# Patient Record
(demographics unavailable — no encounter records)

---

## 2024-11-27 NOTE — HISTORY OF PRESENT ILLNESS
[FreeTextEntry1] : cpe [de-identified] : CORRIE ABRAHAM is a 48 year F who presents for CPE PMHx invasive ductal carcinoma of L breast (2021), anxiety and depression Feels well overall Denies recent surgeries or hospitalizations.  Heme/onc Dr. Villela Breast surgeon Dr. Deirdre Pichardo Ob/gynDr. Melissa Oppenheim

## 2024-11-27 NOTE — HEALTH RISK ASSESSMENT
[No] : In the past 12 months have you used drugs other than those required for medical reasons? No [0] : 2) Feeling down, depressed, or hopeless: Not at all (0) [PHQ-2 Negative - No further assessment needed] : PHQ-2 Negative - No further assessment needed [Never] : Never [Patient reported colonoscopy was normal] : Patient reported colonoscopy was normal [Employed] : employed [] :  [# Of Children ___] : has [unfilled] children [Feels Safe at Home] : Feels safe at home [Fully functional (bathing, dressing, toileting, transferring, walking, feeding)] : Fully functional (bathing, dressing, toileting, transferring, walking, feeding) [Fully functional (using the telephone, shopping, preparing meals, housekeeping, doing laundry, using] : Fully functional and needs no help or supervision to perform IADLs (using the telephone, shopping, preparing meals, housekeeping, doing laundry, using transportation, managing medications and managing finances) [Patient reported PAP Smear was normal] : Patient reported PAP Smear was normal [[id number]] : 0 [MammogramDate] : 10/2023 [PapSmearDate] : 10/24 [BoneDensityDate] : 05/24 [BoneDensityComments] : osteopenia [ColonoscopyDate] : 11/22 [ColonoscopyComments] : GI Dr. Trinidad, repeat due 2032 [de-identified] : , 2 children  [FreeTextEntry2] :

## 2024-11-27 NOTE — HISTORY OF PRESENT ILLNESS
[FreeTextEntry1] : cpe [de-identified] : CORRIE ABRAHAM is a 48 year F who presents for CPE PMHx invasive ductal carcinoma of L breast (2021), anxiety and depression Feels well overall Denies recent surgeries or hospitalizations.  Heme/onc Dr. Villela Breast surgeon Dr. Deirdre Pichardo Ob/gynDr. Melissa Oppenheim

## 2024-11-27 NOTE — HEALTH RISK ASSESSMENT
[No] : In the past 12 months have you used drugs other than those required for medical reasons? No [0] : 2) Feeling down, depressed, or hopeless: Not at all (0) [PHQ-2 Negative - No further assessment needed] : PHQ-2 Negative - No further assessment needed [Never] : Never [Patient reported colonoscopy was normal] : Patient reported colonoscopy was normal [Employed] : employed [] :  [# Of Children ___] : has [unfilled] children [Feels Safe at Home] : Feels safe at home [Fully functional (bathing, dressing, toileting, transferring, walking, feeding)] : Fully functional (bathing, dressing, toileting, transferring, walking, feeding) [Fully functional (using the telephone, shopping, preparing meals, housekeeping, doing laundry, using] : Fully functional and needs no help or supervision to perform IADLs (using the telephone, shopping, preparing meals, housekeeping, doing laundry, using transportation, managing medications and managing finances) [Patient reported PAP Smear was normal] : Patient reported PAP Smear was normal [[id number]] : 0 [MammogramDate] : 10/2023 [PapSmearDate] : 10/24 [BoneDensityDate] : 05/24 [BoneDensityComments] : osteopenia [ColonoscopyDate] : 11/22 [ColonoscopyComments] : GI Dr. Trinidad, repeat due 2032 [de-identified] : , 2 children  [FreeTextEntry2] :

## 2024-11-27 NOTE — ASSESSMENT
[FreeTextEntry1] : Health Care Maintenance - well visit - routine labs ordered - depression screen negative - pap smear 10/2024 - mammogram/US- due, ordered - colonoscopy 11/2022. repeat due 11/2032 - flu vaccine- given and tolerated well  - covid vaccines- reports 4 doses - tdap- unsure when last received, advised to check records and obtain if 10+ years since last dose - advised to get annual eye exams with optometry/ophthalmology, skin exams with dermatology, and dental exams - RTC for CPE in 1 year or sooner prn  Invasive ductal carcinoma of L breast (2021) -s/p L lumpectomy, RT, now on anastrazole qd and lupron q3 months -mammogram/US annually, due, ordered  -s/p MRI 4/2024 -follows with heme/onc Dr. Jamie Villela and breast surgeon Dr. Deirdre Pichardo  Anxiety/depression -c/w sertraline qd  Eczema on abdomen -reports known history -topical triamcinolone rx'ed, use prn

## 2024-11-27 NOTE — HISTORY OF PRESENT ILLNESS
[Disease: _____________________] : Disease: [unfilled] [T: ___] : T[unfilled] [N: ___] : N[unfilled] [AJCC Stage: ____] : AJCC Stage: [unfilled] [de-identified] : Age 45: breast cancer Screen detected: mammo in February 2021, heterogenously dense breasts, no evidence of malignancy. MRI was recommended for elevated Tyrer Rosalia risk score. She had MRI breasts on 9/7/21 revealed which showed L breast 5 mm enhancing focus in upper central breast, middle depth, L breast 1.7 cm peripherally enhancing mass in upper slightly outer left breast, L outer lower breast anterior third, 8 mm enhancing mass, no significant axillary or internal mammary LAD. She had MRI guided biopsy on 9/27/21: Fibrocystic disease including adenosis, microcystic change with apocrine metaplasia, and stromal fibrosis; Fibroadenomatoid change ,L US breast biopsy on 10/4/21 at 12:00, 4 cm from nipple, core biopsy (corresponds to the slightly outer left breast) - path showed Invasive mammary carcinoma, moderately differentiated, favor ductal type (confirmed ductal with positive e-cadherin). [place] score 6/9 (tubules 3/3, nuclear atypia 2/3, mitoses 1/3). Maximum linear tumor length = 1.2 cm, ER 99%, [place] 99%, HER2 2+ CISH neg and L outer lower breast US biopsy at 3:00, retroareolar, core biopsy showed fibroadenoma. CT C/A/P 10/19/21 showed no definitive evidence for metastatic disease within the chest/abdomen and pelvis. Few nonspecific pulmonary micronodules size 2mm, small sclerotic foci likely reflecting benign bone islands in the right proximal humerus and L5 vertebral body. Nuclear bone scan on 10/26/21 showed no radionuclide evidence of osseous metastases.  She has stage II T1cN1a(sn)M0 moderately differentiated invasive ductal carcinoma of the left breast, ER and [place] 99%, HER2 negative, Oncotype Dx RS 12, prognostic group IB. She underwent lumpectomy with negative margins after re-excision and had a positive sentinel lymph node biopsy. She completed adjuvant radiation treatment to the left breast, a dose of 5,240 cGy from 1/26/22 - 2/23/22. She started on ovarian suppression with exemestane in February with Dr Hilliard. She sought continuity of care with  7/2022.   [de-identified] : oderately differentiated invasive ductal carcinoma of the left breast, ER and [place] 99%, HER2 negative, Oncotype Dx RS 12 [de-identified] : Lupron monthly + exemestane 2/2022 to 12/2022\par  Lupron +anastrozole 12/2022 to present [de-identified] : Since last visit, she had interval blood work done3 with Dr White: elevated alkaline phosphatase. Denies any new breast pain or chest wall changes. No back pain, cough or HA. Back pain that she had in September has resolved. She denies any new medications. She had last bone density in May. Noticed diarrhea in the am when she is taking the sertraline and anastrozole prior to bed.

## 2024-11-27 NOTE — PHYSICAL EXAM
[No Acute Distress] : no acute distress [Well-Appearing] : well-appearing [Normal Sclera/Conjunctiva] : normal sclera/conjunctiva [EOMI] : extraocular movements intact [No Respiratory Distress] : no respiratory distress  [No Accessory Muscle Use] : no accessory muscle use [Clear to Auscultation] : lungs were clear to auscultation bilaterally [Normal Rate] : normal rate  [Regular Rhythm] : with a regular rhythm [Normal S1, S2] : normal S1 and S2 [No Edema] : there was no peripheral edema [No Extremity Clubbing/Cyanosis] : no extremity clubbing/cyanosis [Soft] : abdomen soft [Non Tender] : non-tender [Non-distended] : non-distended [Normal Bowel Sounds] : normal bowel sounds [Coordination Grossly Intact] : coordination grossly intact [No Focal Deficits] : no focal deficits [Normal Affect] : the affect was normal [Normal Insight/Judgement] : insight and judgment were intact [de-identified] : eczematous rash on R abdomen

## 2024-11-27 NOTE — ASSESSMENT
[FreeTextEntry1] : She is a 49 y/o  F with Stage II left T1N1 ER/SD positive, [id number] negative breast invasive ductal carcinoma s/p lumpectomy and SLNB. She had Oncotype of 12 and was treated with RT and subsequent ovarian suppression with monthly Lupron and anastrozole. She had back pain and work up with MRI spine without any suspicious changes. Back pain has resolved. We reviewed signs and symptoms of breast cancer recurrence. No new signs or symptoms of recurrence. She will continue with Lupron every 3 months. She will try to separate anastrozole administration time to dinner time to see if any difference with bowels. Questions answered to her satisfaction. She is agreeable with plan. Next follow up in April 2025.   Bone health on endocrine therapy: osteopenia on bone density hip and femoral neck. This may be cause of elevated alkaline phosphatase which happens on endocrine therapy. We reviewed calcium and Vitamin D supplementation along with exercise to maintain bone health.

## 2024-11-27 NOTE — PHYSICAL EXAM
[Fully active, able to carry on all pre-disease performance without restriction] : Status 0 - Fully active, able to carry on all pre-disease performance without restriction [Normal] : affect appropriate [de-identified] : lumpectomy site with hyperpigmentation and mild edema; no abnl masses or axillary LN palpable

## 2024-11-27 NOTE — REASON FOR VISIT
[Follow-Up Visit] : a follow-up [FreeTextEntry2] : follow up for breast cancer on OS and anastrozole

## 2024-11-27 NOTE — PHYSICAL EXAM
[No Acute Distress] : no acute distress [Well-Appearing] : well-appearing [Normal Sclera/Conjunctiva] : normal sclera/conjunctiva [EOMI] : extraocular movements intact [No Respiratory Distress] : no respiratory distress  [No Accessory Muscle Use] : no accessory muscle use [Clear to Auscultation] : lungs were clear to auscultation bilaterally [Normal Rate] : normal rate  [Regular Rhythm] : with a regular rhythm [Normal S1, S2] : normal S1 and S2 [No Edema] : there was no peripheral edema [No Extremity Clubbing/Cyanosis] : no extremity clubbing/cyanosis [Soft] : abdomen soft [Non Tender] : non-tender [Non-distended] : non-distended [Normal Bowel Sounds] : normal bowel sounds [Coordination Grossly Intact] : coordination grossly intact [No Focal Deficits] : no focal deficits [Normal Affect] : the affect was normal [Normal Insight/Judgement] : insight and judgment were intact [de-identified] : eczematous rash on R abdomen

## 2024-11-27 NOTE — HISTORY OF PRESENT ILLNESS
[Disease: _____________________] : Disease: [unfilled] [T: ___] : T[unfilled] [N: ___] : N[unfilled] [AJCC Stage: ____] : AJCC Stage: [unfilled] [de-identified] : Age 45: breast cancer Screen detected: mammo in February 2021, heterogenously dense breasts, no evidence of malignancy. MRI was recommended for elevated Tyrer Rosalia risk score. She had MRI breasts on 9/7/21 revealed which showed L breast 5 mm enhancing focus in upper central breast, middle depth, L breast 1.7 cm peripherally enhancing mass in upper slightly outer left breast, L outer lower breast anterior third, 8 mm enhancing mass, no significant axillary or internal mammary LAD. She had MRI guided biopsy on 9/27/21: Fibrocystic disease including adenosis, microcystic change with apocrine metaplasia, and stromal fibrosis; Fibroadenomatoid change ,L US breast biopsy on 10/4/21 at 12:00, 4 cm from nipple, core biopsy (corresponds to the slightly outer left breast) - path showed Invasive mammary carcinoma, moderately differentiated, favor ductal type (confirmed ductal with positive e-cadherin). [place] score 6/9 (tubules 3/3, nuclear atypia 2/3, mitoses 1/3). Maximum linear tumor length = 1.2 cm, ER 99%, [place] 99%, HER2 2+ CISH neg and L outer lower breast US biopsy at 3:00, retroareolar, core biopsy showed fibroadenoma. CT C/A/P 10/19/21 showed no definitive evidence for metastatic disease within the chest/abdomen and pelvis. Few nonspecific pulmonary micronodules size 2mm, small sclerotic foci likely reflecting benign bone islands in the right proximal humerus and L5 vertebral body. Nuclear bone scan on 10/26/21 showed no radionuclide evidence of osseous metastases.  She has stage II T1cN1a(sn)M0 moderately differentiated invasive ductal carcinoma of the left breast, ER and [place] 99%, HER2 negative, Oncotype Dx RS 12, prognostic group IB. She underwent lumpectomy with negative margins after re-excision and had a positive sentinel lymph node biopsy. She completed adjuvant radiation treatment to the left breast, a dose of 5,240 cGy from 1/26/22 - 2/23/22. She started on ovarian suppression with exemestane in February with Dr Hilliard. She sought continuity of care with  7/2022.   [de-identified] : oderately differentiated invasive ductal carcinoma of the left breast, ER and [place] 99%, HER2 negative, Oncotype Dx RS 12 [de-identified] : Lupron monthly + exemestane 2/2022 to 12/2022\par  Lupron +anastrozole 12/2022 to present [de-identified] : Since last visit, she had interval blood work done3 with Dr White: elevated alkaline phosphatase. Denies any new breast pain or chest wall changes. No back pain, cough or HA. Back pain that she had in September has resolved. She denies any new medications. She had last bone density in May. Noticed diarrhea in the am when she is taking the sertraline and anastrozole prior to bed.

## 2024-11-27 NOTE — PHYSICAL EXAM
[Fully active, able to carry on all pre-disease performance without restriction] : Status 0 - Fully active, able to carry on all pre-disease performance without restriction [Normal] : affect appropriate [de-identified] : lumpectomy site with hyperpigmentation and mild edema; no abnl masses or axillary LN palpable

## 2024-11-27 NOTE — ASSESSMENT
[FreeTextEntry1] : She is a 47 y/o  F with Stage II left T1N1 ER/TX positive, [id number] negative breast invasive ductal carcinoma s/p lumpectomy and SLNB. She had Oncotype of 12 and was treated with RT and subsequent ovarian suppression with monthly Lupron and anastrozole. She had back pain and work up with MRI spine without any suspicious changes. Back pain has resolved. We reviewed signs and symptoms of breast cancer recurrence. No new signs or symptoms of recurrence. She will continue with Lupron every 3 months. She will try to separate anastrozole administration time to dinner time to see if any difference with bowels. Questions answered to her satisfaction. She is agreeable with plan. Next follow up in April 2025.   Bone health on endocrine therapy: osteopenia on bone density hip and femoral neck. This may be cause of elevated alkaline phosphatase which happens on endocrine therapy. We reviewed calcium and Vitamin D supplementation along with exercise to maintain bone health.

## 2025-01-06 NOTE — HISTORY OF PRESENT ILLNESS
[Other Location: e.g. School (Enter Location, City,State)___] : at [unfilled], at the time of the visit. [Medical Office: ([facility])___] : at the medical office located in  [Verbal consent obtained from patient] : the patient, [unfilled]

## 2025-02-26 NOTE — PHYSICAL EXAM
[No Acute Distress] : no acute distress [Well-Appearing] : well-appearing [Normal Sclera/Conjunctiva] : normal sclera/conjunctiva [EOMI] : extraocular movements intact [Normal Oropharynx] : the oropharynx was normal [No Lymphadenopathy] : no lymphadenopathy [Supple] : supple [No Respiratory Distress] : no respiratory distress  [No Accessory Muscle Use] : no accessory muscle use [Clear to Auscultation] : lungs were clear to auscultation bilaterally [Normal Rate] : normal rate  [Regular Rhythm] : with a regular rhythm [Normal S1, S2] : normal S1 and S2 [Normal Affect] : the affect was normal [Normal Insight/Judgement] : insight and judgment were intact

## 2025-02-26 NOTE — HISTORY OF PRESENT ILLNESS
[FreeTextEntry8] : CORRIE ABRAHAM is a 48 year F who presents for acute visit  Reports she had covid early 1/2025, followed by flu type A later in the month. Most symptoms have resolved but still has intermittent cough and clearing of her throat.  Of note, hx of borderline asthma on breathing test 5+ years ago.

## 2025-02-26 NOTE — ASSESSMENT
[FreeTextEntry1] : Post viral cough Hx of asthma  -c/w flonase, can use albuterol prn -supportive care discussed -pulm if refractory

## 2025-04-26 NOTE — REASON FOR VISIT
[Follow-Up Visit] : a follow-up [FreeTextEntry2] : follow up for breast cancer on Eligard and anastrozole  Standing

## 2025-04-26 NOTE — ASSESSMENT
[FreeTextEntry1] : She is a 47 y/o  F with Stage II left T1N1 ER/MO positive, Oom3mss negative breast invasive ductal carcinoma s/p lumpectomy and SLNB. She had Oncotype of 12 and was treated with RT and subsequent ovarian suppression with monthly Lupron and anastrozole. She had her last breast imaging done 3/2025. No new findings on exam today. Next follow up in 6 months but earlier if any new symptoms.  Bone health on endocrine therapy: osteopenia on bone density hip and femoral neck. Last bone density 2024. We reviewed calcium and Vitamin D supplementation along with exercise to maintain bone health.  Eczema: reviewed antihistamine and trial of triamcinolone twice a day for 5 days and then as needed. If not improved, will refer her to dermatology: Dr Lindquist.

## 2025-04-26 NOTE — PHYSICAL EXAM
[Fully active, able to carry on all pre-disease performance without restriction] : Status 0 - Fully active, able to carry on all pre-disease performance without restriction [Normal] : affect appropriate [de-identified] : lumpectomy site with hyperpigmentation and mild edema; no abnl masses or axillary LN palpable  [de-identified] : scratches and skin hyperpigmentation over the LUE and RLE

## 2025-04-26 NOTE — REVIEW OF SYSTEMS
[Fever] : no fever [Chills] : no chills [Night Sweats] : no night sweats [Fatigue] : fatigue [Recent Change In Weight] : ~T recent weight change [Diarrhea: Grade 0] : Diarrhea: Grade 0 [Joint Pain] : no joint pain [Joint Stiffness] : joint stiffness [Muscle Pain] : no muscle pain [Muscle Weakness] : no muscle weakness [Skin Rash] : no skin rash [Skin Wound] : no skin wound [Negative] : Allergic/Immunologic [FreeTextEntry2] : wt gain 4 lbs  [de-identified] : itching skin over the LUE

## 2025-04-26 NOTE — HISTORY OF PRESENT ILLNESS
[Disease: _____________________] : Disease: [unfilled] [T: ___] : T[unfilled] [N: ___] : N[unfilled] [AJCC Stage: ____] : AJCC Stage: [unfilled] [de-identified] : Age 45: breast cancer Screen detected: mammo in February 2021, heterogenously dense breasts, no evidence of malignancy. MRI was recommended for elevated Tyrer Rosalia risk score. She had MRI breasts on 9/7/21 revealed which showed L breast 5 mm enhancing focus in upper central breast, middle depth, L breast 1.7 cm peripherally enhancing mass in upper slightly outer left breast, L outer lower breast anterior third, 8 mm enhancing mass, no significant axillary or internal mammary LAD. She had MRI guided biopsy on 9/27/21: Fibrocystic disease including adenosis, microcystic change with apocrine metaplasia, and stromal fibrosis; Fibroadenomatoid change ,L US breast biopsy on 10/4/21 at 12:00, 4 cm from nipple, core biopsy (corresponds to the slightly outer left breast) - path showed Invasive mammary carcinoma, moderately differentiated, favor ductal type (confirmed ductal with positive e-cadherin). [place] score 6/9 (tubules 3/3, nuclear atypia 2/3, mitoses 1/3). Maximum linear tumor length = 1.2 cm, ER 99%, [place] 99%, HER2 2+ CISH neg and L outer lower breast US biopsy at 3:00, retroareolar, core biopsy showed fibroadenoma. CT C/A/P 10/19/21 showed no definitive evidence for metastatic disease within the chest/abdomen and pelvis. Few nonspecific pulmonary micronodules size 2mm, small sclerotic foci likely reflecting benign bone islands in the right proximal humerus and L5 vertebral body. Nuclear bone scan on 10/26/21 showed no radionuclide evidence of osseous metastases.  She has stage II T1cN1a(sn)M0 moderately differentiated invasive ductal carcinoma of the left breast, ER and [place] 99%, HER2 negative, Oncotype Dx RS 12, prognostic group IB. She underwent lumpectomy with negative margins after re-excision and had a positive sentinel lymph node biopsy. She completed adjuvant radiation treatment to the left breast, a dose of 5,240 cGy from 1/26/22 - 2/23/22. She started on ovarian suppression with exemestane in February with Dr Hilliard. She sought continuity of care with  7/2022.   [de-identified] : oderately differentiated invasive ductal carcinoma of the left breast, ER and [place] 99%, HER2 negative, Oncotype Dx RS 12 [de-identified] : Lupron monthly + exemestane 2/2022 to 12/2022\par  Lupron +anastrozole 12/2022 to present [de-identified] : She continues to tolerate anastrozole and Eligard. Feels she has been gaining weight more easily. She also has been having more eczema: scratching LUE and RLE. She denies any rash or allergens: has pet the is hypoallergenic. Denies any new breast pain or chest wall changes. No back pain, cough or HA.